# Patient Record
Sex: FEMALE | Race: WHITE
[De-identification: names, ages, dates, MRNs, and addresses within clinical notes are randomized per-mention and may not be internally consistent; named-entity substitution may affect disease eponyms.]

---

## 2019-07-26 ENCOUNTER — HOSPITAL ENCOUNTER (INPATIENT)
Dept: HOSPITAL 91 - OBT | Age: 29
LOS: 4 days | Discharge: HOME | DRG: 832 | End: 2019-07-30
Payer: COMMERCIAL

## 2019-07-26 ENCOUNTER — HOSPITAL ENCOUNTER (OUTPATIENT)
Dept: HOSPITAL 10 - OBT | Age: 29
Discharge: HOME | End: 2019-07-26
Attending: OBSTETRICS & GYNECOLOGY
Payer: COMMERCIAL

## 2019-07-26 ENCOUNTER — HOSPITAL ENCOUNTER (INPATIENT)
Dept: HOSPITAL 10 - OBT | Age: 29
LOS: 4 days | Discharge: HOME | DRG: 832 | End: 2019-07-30
Attending: OBSTETRICS & GYNECOLOGY | Admitting: OBSTETRICS & GYNECOLOGY
Payer: COMMERCIAL

## 2019-07-26 ENCOUNTER — HOSPITAL ENCOUNTER (OUTPATIENT)
Dept: HOSPITAL 91 - OBT | Age: 29
Discharge: HOME | End: 2019-07-26
Payer: COMMERCIAL

## 2019-07-26 VITALS
HEIGHT: 62 IN | HEIGHT: 62 IN | BODY MASS INDEX: 25.4 KG/M2 | WEIGHT: 138.01 LBS | BODY MASS INDEX: 25.4 KG/M2 | WEIGHT: 138.01 LBS

## 2019-07-26 VITALS
BODY MASS INDEX: 26.92 KG/M2 | WEIGHT: 137.13 LBS | WEIGHT: 137.13 LBS | BODY MASS INDEX: 26.92 KG/M2 | HEIGHT: 60 IN | HEIGHT: 60 IN

## 2019-07-26 VITALS — DIASTOLIC BLOOD PRESSURE: 81 MMHG | RESPIRATION RATE: 16 BRPM | HEART RATE: 108 BPM | SYSTOLIC BLOOD PRESSURE: 131 MMHG

## 2019-07-26 VITALS — HEART RATE: 86 BPM | DIASTOLIC BLOOD PRESSURE: 78 MMHG | SYSTOLIC BLOOD PRESSURE: 131 MMHG | RESPIRATION RATE: 16 BRPM

## 2019-07-26 DIAGNOSIS — Z3A.25: ICD-10-CM

## 2019-07-26 DIAGNOSIS — O26.872: ICD-10-CM

## 2019-07-26 DIAGNOSIS — N89.8: ICD-10-CM

## 2019-07-26 DIAGNOSIS — O23.02: ICD-10-CM

## 2019-07-26 DIAGNOSIS — O60.02: Primary | ICD-10-CM

## 2019-07-26 DIAGNOSIS — O26.892: Primary | ICD-10-CM

## 2019-07-26 LAB
ADD UMIC: NO
UR ASCORBIC ACID: NEGATIVE MG/DL
UR BILIRUBIN (DIP): NEGATIVE MG/DL
UR BLOOD (DIP): NEGATIVE MG/DL
UR CLARITY: CLEAR
UR COLOR: YELLOW
UR GLUCOSE (DIP): NEGATIVE MG/DL
UR KETONES (DIP): NEGATIVE MG/DL
UR LEUKOCYTE ESTERASE (DIP): NEGATIVE LEU/UL
UR NITRITE (DIP): NEGATIVE MG/DL
UR PH (DIP): 6 (ref 5–9)
UR SPECIFIC GRAVITY (DIP): 1.02 (ref 1–1.03)
UR TOTAL PROTEIN (DIP): NEGATIVE MG/DL
UR UROBILINOGEN (DIP): NEGATIVE MG/DL

## 2019-07-26 PROCEDURE — 93970 EXTREMITY STUDY: CPT

## 2019-07-26 PROCEDURE — G0463 HOSPITAL OUTPT CLINIC VISIT: HCPCS

## 2019-07-26 PROCEDURE — 76815 OB US LIMITED FETUS(S): CPT

## 2019-07-26 PROCEDURE — 85730 THROMBOPLASTIN TIME PARTIAL: CPT

## 2019-07-26 PROCEDURE — 85610 PROTHROMBIN TIME: CPT

## 2019-07-26 PROCEDURE — 86901 BLOOD TYPING SEROLOGIC RH(D): CPT

## 2019-07-26 PROCEDURE — 87086 URINE CULTURE/COLONY COUNT: CPT

## 2019-07-26 PROCEDURE — 86900 BLOOD TYPING SEROLOGIC ABO: CPT

## 2019-07-26 PROCEDURE — 76817 TRANSVAGINAL US OBSTETRIC: CPT

## 2019-07-26 PROCEDURE — 84439 ASSAY OF FREE THYROXINE: CPT

## 2019-07-26 PROCEDURE — 81003 URINALYSIS AUTO W/O SCOPE: CPT

## 2019-07-26 PROCEDURE — 86850 RBC ANTIBODY SCREEN: CPT

## 2019-07-26 PROCEDURE — 96372 THER/PROPH/DIAG INJ SC/IM: CPT

## 2019-07-26 PROCEDURE — 80053 COMPREHEN METABOLIC PANEL: CPT

## 2019-07-26 PROCEDURE — 85025 COMPLETE CBC W/AUTO DIFF WBC: CPT

## 2019-07-26 PROCEDURE — 80307 DRUG TEST PRSMV CHEM ANLYZR: CPT

## 2019-07-26 PROCEDURE — 84443 ASSAY THYROID STIM HORMONE: CPT

## 2019-07-26 PROCEDURE — 83735 ASSAY OF MAGNESIUM: CPT

## 2019-07-26 RX ADMIN — BETAMETHASONE SODIUM PHOSPHATE AND BETAMETHASONE ACETATE 1 MG: 3; 3 INJECTION, SUSPENSION INTRA-ARTICULAR; INTRALESIONAL; INTRAMUSCULAR at 14:09

## 2019-07-26 NOTE — TRIAGE
===================================

OB Triage

===================================

Datetime Report Generated by CPN: 07/26/2019 14:41

   

   

===========================

Datetime: 07/26/2019 14:00

===========================

   

 Monitor Mode:  External

 Resting Tone [place]:  Relaxed

   

===================================

Fetal Heart Rate

===================================

   

 FHR Baseline Rate:  135

 Monitor Mode:  External US

 FHR Baseline Changes:  No Baseline Change

 Variability:  Moderate 6-25 bpm

 Accelerations:  15X15

 Decelerations:  None

 Category:  Category I

 Pain Presence:  None/Denies

   

===========================

Datetime: 07/26/2019 13:00

===========================

   

   

===================================

Labor Evaluation

===================================

   

 Frequency:  0

      

 Monitor Mode:  External

 Resting Tone [place]:  Relaxed

   

===================================

Fetal Heart Rate

===================================

   

 FHR Baseline Rate:  135

 Monitor Mode:  External US

 FHR Baseline Changes:  No Baseline Change

 Variability:  Moderate 6-25 bpm

 Accelerations:  15X15

 Decelerations:  None

 Pain Presence:  None/Denies

   

===========================

Datetime: 07/26/2019 12:55

===========================

   

 Monitor Mode:  External US

   

===========================

Datetime: 07/26/2019 12:40

===========================

   

 Comments:  FHR US not detecting due to Pt sitting up eating food. 

   

===========================

Datetime: 07/26/2019 12:00

===========================

   

   

===================================

Labor Evaluation

===================================

   

 Frequency:  0

      

 Monitor Mode:  External

 Resting Tone [place]:  Relaxed

 Contraction Comments:  none via toco

   

===================================

Fetal Heart Rate

===================================

   

 FHR Baseline Rate:  135

 Monitor Mode:  External US

 FHR Baseline Changes:  No Baseline Change

 Variability:  Moderate 6-25 bpm

 Accelerations:  15X15

 Decelerations:  None

 Pain Presence:  None/Denies

   

===========================

Datetime: 07/26/2019 11:00

===========================

   

 Monitor Mode:  External

 Resting Tone [place]:  Relaxed

   

===================================

Fetal Heart Rate

===================================

   

 FHR Baseline Rate:  140

 Monitor Mode:  External US

 FHR Baseline Changes:  No Baseline Change

 Variability:  Moderate 6-25 bpm

 Accelerations:  15X15

 Decelerations:  None

 Category:  Category I

 Pain Presence:  None/Denies

   

===========================

Datetime: 07/26/2019 10:45

===========================

   

 Stage of Pregnancy:  OB Triage

   

===================================

Maternal Assessment

===================================

   

 Level of Consciousness:  Keenly Alert, Responsive

 DTR's/Clonus:  DTRs 2+; No Clonus

 Headache:  Denies

 Blurred Vision:  No

 Respiratory Effort:  Unlabored; Regular Rhythm; Equal Expansion

 Breath Sounds, Left:  Clear and Equal

 Breath Sounds, Right:  Clear and Equal

 Nausea/Vomiting:  Denies

 RUQ Epigastric Pain:  Denies

 Lower Extremities Edema:  None

     Degree:  None

 Upper Extremities Edema:  None

     Degree:  None

 Facial Edema:  None

   

===================================

Fall Risk Assessment

===================================

   

 History of Falling:  (0) No

 Secondary Diagnosis:  (0) No

 Ambulatory Aid:  (0) Bedrest/Nurse Assist

 IV Therapy:  (0) No

 Gait:  (0) Normal/Bedrest/Immobile

 Mental Status:  (0) Oriented to Own Ability

 Fall Score:  0

 Fall Risk Score Definition:  No Risk: No action required

   

===================================

Pain Assessment

===================================

   

 Pain Scale:  0

 Pain Presence:  None/Denies

 Pain Type:  N/A

   

===========================

Datetime: 07/26/2019 10:44

===========================

   

 Time of Arrival:  07/26/2019 10:22

 EGA:  25.2

   

===========================

Datetime: 07/26/2019 10:42

===========================

   

 Time of Arrival:  07/26/2019 10:22

 Arrived By:  Ambulatory

 Arrived From:   Office

 Chief Complaint:  Bleeding Upon wiping after urinating

 Fetal Movement:  Present

 Contractions:  Occasional

 Rupture of Membranes:  Denies

 Vaginal Bleeding:  Normal Show

 Vaginal Discharge:  Present

 Recent Sexual Intercouse:  Denies

 Abdominal Trauma:  Not Applicable

 Patient Complaints:  Other

 Time Provider Notified:  07/26/2019 10:43

 Provider Notified:  Eshaghian

 Initial Plan:  NST, Cervical Length

   

===========================

Datetime: 07/26/2019 10:35

===========================

   

 Temperature Route:  Oral

   

===========================

Datetime: 07/26/2019 10:34

===========================

   

 Stage of Pregnancy:  OB Triage

## 2019-07-26 NOTE — PN
Triage Information


Date/Time


19


Reason for visit:  vaginal discharge


Weeks of Gestation


25w2d


/Para


A1


Hypertention:  none


Additional information


plan to have amniocentesis  for  thich nuchal fold





Objective





Vital Signs


  Date      Temp  Pulse  Resp  B/P (MAP)   Pulse Ox  O2          O2 Flow    FiO2


Time                                                 Delivery    Rate


   19  98.4     86    16      131/78            Room Air


     10:40                           (95)





Contractions:  < 5 Minutes Apart


Exam


uc resolved





Results/Medications


Results 24 hrs





Laboratory Tests


                    Test
                     19
10:59


                    Urine Color               YELLOW


                    Urine Clarity             CLEAR


                    Urine pH                          6.0


                    Urine Specific Gravity          1.021


                    Urine Ketones             NEGATIVE


                    Urine Nitrite             NEGATIVE


                    Urine Bilirubin           NEGATIVE


                    Urine Urobilinogen        NEGATIVE


                    Urine Leukocyte Esterase  NEGATIVE


                    Urine Hemoglobin          NEGATIVE


                    Urine Glucose             NEGATIVE


                    Urine Total Protein       NEGATIVE





Medications


BMZx1 dose


Imaging Results


CVL  2.7


Disposition:  Discharge


Assessment/Plan


A    IUP  25w2d 


       short cervix  but not to be admitted 


 P    BMZx1  given


        RTH tomorrow same time  for 2nd  dose of BMZ


        modified  bed rest











GRACIELA LAWSON MD                2019 16:44

## 2019-07-27 LAB
ADD MAN DIFF?: NO
ALANINE AMINOTRANSFERASE: 16 IU/L (ref 13–69)
ALBUMIN/GLOBULIN RATIO: 1.1
ALBUMIN: 4.3 G/DL (ref 3.3–4.9)
ALKALINE PHOSPHATASE: 65 IU/L (ref 42–121)
ANION GAP: 11 (ref 5–13)
ASPARTATE AMINO TRANSFERASE: 22 IU/L (ref 15–46)
BASOPHIL #: 0 10^3/UL (ref 0–0.1)
BASOPHILS %: 0.3 % (ref 0–2)
BILIRUBIN,DIRECT: 0 MG/DL (ref 0–0.2)
BILIRUBIN,TOTAL: 0.6 MG/DL (ref 0.2–1.3)
BLOOD UREA NITROGEN: 4 MG/DL (ref 7–20)
CALCIUM: 9.9 MG/DL (ref 8.4–10.2)
CARBON DIOXIDE: 21 MMOL/L (ref 21–31)
CHLORIDE: 106 MMOL/L (ref 97–110)
CREATININE: 0.43 MG/DL (ref 0.44–1)
EOSINOPHILS #: 0 10^3/UL (ref 0–0.5)
EOSINOPHILS %: 0 % (ref 0–7)
FREE T4 (FREE THYROXINE): 0.57 NG/DL (ref 0.79–2.35)
GLOBULIN: 3.9 G/DL (ref 1.3–3.2)
GLUCOSE: 112 MG/DL (ref 70–220)
HEMATOCRIT: 33.7 % (ref 37–47)
HEMOGLOBIN: 11.5 G/DL (ref 12–16)
IMMATURE GRANS #M: 0.18 10^3/UL (ref 0–0.03)
IMMATURE GRANS % (M): 1.5 % (ref 0–0.43)
INR: 1.04
LYMPHOCYTES #: 1.2 10^3/UL (ref 0.8–2.9)
LYMPHOCYTES %: 9.7 % (ref 15–51)
MAGNESIUM: 4.5 MG/DL (ref 1.7–2.5)
MAGNESIUM: 5.4 MG/DL (ref 1.7–2.5)
MAGNESIUM: 5.8 MG/DL (ref 1.7–2.5)
MEAN CORPUSCULAR HEMOGLOBIN: 30.6 PG (ref 29–33)
MEAN CORPUSCULAR HGB CONC: 34.1 G/DL (ref 32–37)
MEAN CORPUSCULAR VOLUME: 89.6 FL (ref 82–101)
MEAN PLATELET VOLUME: 9.6 FL (ref 7.4–10.4)
MONOCYTE #: 0.2 10^3/UL (ref 0.3–0.9)
MONOCYTES %: 1.6 % (ref 0–11)
NEUTROPHIL #: 10.7 10^3/UL (ref 1.6–7.5)
NEUTROPHILS %: 86.9 % (ref 39–77)
NUCLEATED RED BLOOD CELLS #: 0 10^3/UL (ref 0–0)
NUCLEATED RED BLOOD CELLS%: 0 /100WBC (ref 0–0)
PARTIAL THROMBOPLASTIN TIME: 31.5 SEC (ref 23–35)
PLATELET COUNT: 234 10^3/UL (ref 140–415)
POTASSIUM: 3.9 MMOL/L (ref 3.5–5.1)
PROTIME: 13.7 SEC (ref 11.9–14.9)
PT RATIO: 1.1
RED BLOOD COUNT: 3.76 10^6/UL (ref 4.2–5.4)
RED CELL DISTRIBUTION WIDTH: 12.9 % (ref 11.5–14.5)
SODIUM: 138 MMOL/L (ref 135–144)
THYROID STIMULATING HORMONE: 0.85 MIU/L (ref 0.47–4.68)
TOTAL PROTEIN: 8.2 G/DL (ref 6.1–8.1)
WHITE BLOOD COUNT: 12.4 10^3/UL (ref 4.8–10.8)

## 2019-07-27 RX ADMIN — ACETAMINOPHEN 1 MG: 325 TABLET, FILM COATED ORAL at 03:01

## 2019-07-27 RX ADMIN — MAGNESIUM SULFATE HEPTAHYDRATE 1 MLS/HR: 40 INJECTION, SOLUTION INTRAVENOUS at 02:21

## 2019-07-27 RX ADMIN — PYRIDOXINE HYDROCHLORIDE 1 MLS/HR: 100 INJECTION, SOLUTION INTRAMUSCULAR; INTRAVENOUS at 16:11

## 2019-07-27 RX ADMIN — MAGNESIUM SULFATE IN WATER 1 MLS/HR: 40 INJECTION, SOLUTION INTRAVENOUS at 13:29

## 2019-07-27 RX ADMIN — Medication 1 TAB: at 08:55

## 2019-07-27 RX ADMIN — THIAMINE HYDROCHLORIDE 1 MLS/HR: 100 INJECTION, SOLUTION INTRAMUSCULAR; INTRAVENOUS at 02:35

## 2019-07-27 RX ADMIN — CEFTRIAXONE SCH MLS/HR: 1 INJECTION, SOLUTION INTRAVENOUS at 02:34

## 2019-07-27 RX ADMIN — PYRIDOXINE HYDROCHLORIDE 1 MLS/HR: 100 INJECTION, SOLUTION INTRAMUSCULAR; INTRAVENOUS at 01:58

## 2019-07-27 RX ADMIN — MAGNESIUM SULFATE IN WATER SCH MLS/HR: 40 INJECTION, SOLUTION INTRAVENOUS at 13:29

## 2019-07-27 RX ADMIN — MAGNESIUM SULFATE IN WATER SCH MLS/HR: 40 INJECTION, SOLUTION INTRAVENOUS at 02:58

## 2019-07-27 RX ADMIN — CEFTRIAXONE 1 MLS/HR: 1 INJECTION, SOLUTION INTRAVENOUS at 02:34

## 2019-07-27 RX ADMIN — MAGNESIUM SULFATE IN WATER 1 MLS/HR: 40 INJECTION, SOLUTION INTRAVENOUS at 02:58

## 2019-07-27 RX ADMIN — DOCUSATE SODIUM SCH MG: 100 CAPSULE, LIQUID FILLED ORAL at 08:54

## 2019-07-27 RX ADMIN — FOLIC ACID SCH MLS/HR: 5 INJECTION, SOLUTION INTRAMUSCULAR; INTRAVENOUS; SUBCUTANEOUS at 02:35

## 2019-07-27 RX ADMIN — DOCUSATE SODIUM 1 MG: 100 CAPSULE, LIQUID FILLED ORAL at 08:54

## 2019-07-27 RX ADMIN — Medication SCH TAB: at 08:55

## 2019-07-27 RX ADMIN — BETAMETHASONE SODIUM PHOSPHATE AND BETAMETHASONE ACETATE 1 MG: 3; 3 INJECTION, SUSPENSION INTRA-ARTICULAR; INTRALESIONAL; INTRAMUSCULAR at 14:02

## 2019-07-27 NOTE — PREOPHP
DATE OF ADMISSION: 2019

 

HISTORY OF PRESENT ILLNESS:  Ms. Tabatha Samuel is a 29-year-old  4, para 2, EDC 2019 int
rauterine pregnancy at 25 weeks and 2 days gestational age, presented to triage complaining of lower 
abdominal pain radiating to her back.  She denies any headaches, nausea, vomiting, shortness of breat
h, or visual change.  She was also seen yesterday for similar evaluation where she was given steroid 
treatment secondary to a short cervix.  Her prenatal care took place at [facility]
.

 

PAST MEDICAL HISTORY:  Hyperthyroidism.

 

MEDICATIONS:  Prenatal vitamins.

 

PAST SURGICAL HISTORY:  Times 2 vaginal deliveries, x1 missed AB.

 

GYNECOLOGIC HISTORY:  Tw[age], regular 3 to 4 days.  Denies any sexually transmitted infections.  Sexu
ally active with 1 partner.

 

SOCIAL HISTORY:  Denies any smoking, drugs or alcohol.

 

FAMILY HISTORY:  None.

 

REVIEW OF SYSTEMS:  All within normal except history of present illness.

 

PHYSICAL EXAMINATION:

HEENT:  Within normal.

LUNGS:  CTA bilateral.

CARDIOVASCULAR:  S1, S2, regular rhythm.

ABDOMEN:  Gravid, positive lower abdominal tenderness.  Positive bilateral CVA tenderness.

EXTREMITIES:  Negative edema.  No calf tenderness.

PELVIC:  Vaginal exam short and closed.  Fetal heart tracing category 1.  [place] regular contractions.

 

ASSESSMENT:  Intrauterine pregnancy at 25 weeks and 2 days gestational age with  contraction, 
suspected pyelonephritis.

 

PLAN:  Admit patient for IV hydration, Rocephin 1 gram q.24 hours, magnesium sulfate for tocolysis, n
eonatology consult, perinatology consult, repeat cervical length.

 

 

Dictated By: KENDAL GAUTHIER/ALISTAIR

DD:    2019 01:55:09

DT:    2019 02:10:49

Conf#: [id number]

DID#:  [id number]

## 2019-07-28 LAB
MAGNESIUM: 5.1 MG/DL (ref 1.7–2.5)
MAGNESIUM: 6 MG/DL (ref 1.7–2.5)
MAGNESIUM: 6.3 MG/DL (ref 1.7–2.5)

## 2019-07-28 RX ADMIN — MAGNESIUM SULFATE IN WATER SCH MLS/HR: 40 INJECTION, SOLUTION INTRAVENOUS at 00:41

## 2019-07-28 RX ADMIN — Medication SCH TAB: at 09:14

## 2019-07-28 RX ADMIN — CEFTRIAXONE SCH MLS/HR: 1 INJECTION, SOLUTION INTRAVENOUS at 02:42

## 2019-07-28 RX ADMIN — CEFTRIAXONE 1 MLS/HR: 1 INJECTION, SOLUTION INTRAVENOUS at 02:42

## 2019-07-28 RX ADMIN — DOCUSATE SODIUM 1 MG: 100 CAPSULE, LIQUID FILLED ORAL at 09:14

## 2019-07-28 RX ADMIN — DOCUSATE SODIUM SCH MG: 100 CAPSULE, LIQUID FILLED ORAL at 09:14

## 2019-07-28 RX ADMIN — THIAMINE HYDROCHLORIDE 1 MLS/HR: 100 INJECTION, SOLUTION INTRAMUSCULAR; INTRAVENOUS at 02:01

## 2019-07-28 RX ADMIN — MAGNESIUM SULFATE IN WATER 1 MLS/HR: 40 INJECTION, SOLUTION INTRAVENOUS at 00:41

## 2019-07-28 RX ADMIN — FOLIC ACID SCH MLS/HR: 5 INJECTION, SOLUTION INTRAMUSCULAR; INTRAVENOUS; SUBCUTANEOUS at 22:31

## 2019-07-28 RX ADMIN — THIAMINE HYDROCHLORIDE 1 MLS/HR: 100 INJECTION, SOLUTION INTRAMUSCULAR; INTRAVENOUS at 22:31

## 2019-07-28 RX ADMIN — ONDANSETRON HYDROCHLORIDE 1 MG: 2 INJECTION, SOLUTION INTRAMUSCULAR; INTRAVENOUS at 00:42

## 2019-07-28 RX ADMIN — FOLIC ACID SCH MLS/HR: 5 INJECTION, SOLUTION INTRAMUSCULAR; INTRAVENOUS; SUBCUTANEOUS at 15:59

## 2019-07-28 RX ADMIN — Medication 1 TAB: at 09:14

## 2019-07-28 RX ADMIN — FOLIC ACID SCH MLS/HR: 5 INJECTION, SOLUTION INTRAMUSCULAR; INTRAVENOUS; SUBCUTANEOUS at 02:01

## 2019-07-28 RX ADMIN — THIAMINE HYDROCHLORIDE 1 MLS/HR: 100 INJECTION, SOLUTION INTRAMUSCULAR; INTRAVENOUS at 15:59

## 2019-07-28 NOTE — QN
Documentation


Comment


progress note


patient seen and evaluated


no complaints


vs stable afebrile


ab gravid soft right cva tenderness


extremity no edema no calf tenderness





fhr 140's


toco no ctx





a/  Intrauterine pregnancy at 25 weeks and 2 days gestational age with  


contraction resolving, suspected pyelonephritis.


 


PLAN:  d/c mg 24 hrs after second dose of steroids


continue iv antibiotic


perinatology and nicu consult.


f/u urine cultures











KENDAL KIMBROUGH MD           2019 10:02

## 2019-07-29 RX ADMIN — FOLIC ACID SCH MLS/HR: 5 INJECTION, SOLUTION INTRAMUSCULAR; INTRAVENOUS; SUBCUTANEOUS at 07:45

## 2019-07-29 RX ADMIN — DOCUSATE SODIUM 1 MG: 100 CAPSULE, LIQUID FILLED ORAL at 08:50

## 2019-07-29 RX ADMIN — DOCUSATE SODIUM SCH MG: 100 CAPSULE, LIQUID FILLED ORAL at 20:46

## 2019-07-29 RX ADMIN — CEFTRIAXONE SCH MLS/HR: 1 INJECTION, SOLUTION INTRAVENOUS at 02:28

## 2019-07-29 RX ADMIN — THIAMINE HYDROCHLORIDE 1 MLS/HR: 100 INJECTION, SOLUTION INTRAMUSCULAR; INTRAVENOUS at 20:28

## 2019-07-29 RX ADMIN — Medication SCH TAB: at 08:50

## 2019-07-29 RX ADMIN — FOLIC ACID SCH MLS/HR: 5 INJECTION, SOLUTION INTRAMUSCULAR; INTRAVENOUS; SUBCUTANEOUS at 20:28

## 2019-07-29 RX ADMIN — Medication 1 TAB: at 08:50

## 2019-07-29 RX ADMIN — CEFTRIAXONE 1 MLS/HR: 1 INJECTION, SOLUTION INTRAVENOUS at 02:28

## 2019-07-29 RX ADMIN — DOCUSATE SODIUM SCH MG: 100 CAPSULE, LIQUID FILLED ORAL at 08:50

## 2019-07-29 RX ADMIN — DOCUSATE SODIUM 1 MG: 100 CAPSULE, LIQUID FILLED ORAL at 20:46

## 2019-07-29 RX ADMIN — THIAMINE HYDROCHLORIDE 1 MLS/HR: 100 INJECTION, SOLUTION INTRAMUSCULAR; INTRAVENOUS at 07:45

## 2019-07-29 NOTE — QN
Documentation


Comment


progress note


patient seen and evaluated


no complaint


vs stable afebrile


ab gravid mild left cva tenderness


extremity no edema no calf tenderness





a/   Intrauterine pregnancy at 25 weeks and 3 days gestational age with  


contraction, pyelonephritis improving symptoms





p/ continue iv antibiotics











KENDAL KIMBROUGH MD           2019 11:57

## 2019-07-30 RX ADMIN — DOCUSATE SODIUM 1 MG: 100 CAPSULE, LIQUID FILLED ORAL at 08:46

## 2019-07-30 RX ADMIN — THIAMINE HYDROCHLORIDE 1 MLS/HR: 100 INJECTION, SOLUTION INTRAMUSCULAR; INTRAVENOUS at 08:47

## 2019-07-30 RX ADMIN — CEFTRIAXONE 1 MLS/HR: 1 INJECTION, SOLUTION INTRAVENOUS at 02:11

## 2019-07-30 RX ADMIN — Medication SCH TAB: at 08:46

## 2019-07-30 RX ADMIN — DOCUSATE SODIUM SCH MG: 100 CAPSULE, LIQUID FILLED ORAL at 08:46

## 2019-07-30 RX ADMIN — Medication 1 TAB: at 08:46

## 2019-07-30 RX ADMIN — CEFTRIAXONE SCH MLS/HR: 1 INJECTION, SOLUTION INTRAVENOUS at 02:11

## 2019-07-30 RX ADMIN — FOLIC ACID SCH MLS/HR: 5 INJECTION, SOLUTION INTRAMUSCULAR; INTRAVENOUS; SUBCUTANEOUS at 08:47

## 2019-07-31 NOTE — DS
DATE OF ADMISSION: 2019

DATE OF DISCHARGE: 2019

 

PRIMARY DIAGNOSES:  Intrauterine pregnancy at 25 weeks and 2 days gestational age with  contra
ctions, suspected pyelonephritis, undelivered.

 

PROCEDURE:  None.

 

CONDITION ON DISCHARGE:  Stable.

 

ACTIVITY:  As tolerated.

 

DIET:  Regular.

 

MEDICATIONS ON DISCHARGE:  Continue prenatal vitamins.

 

DISCHARGE SUMMARY:  Ms. Tabatha Samuel is a 29-year-old  4, para 2, admitted on 2019 for 
 contractions with the suspected pyelonephritis.  She was given IV hydration with Rocephin 1 g
iqra q.24 hours and given magnesium sulfate for tocolysis.  Her urine culture was returned to be negat
gato.  She currently denies any headache, nausea, vomiting, shortness of breath, visual changes or epi
gastric pain.  She denies any contractions.  Patient feels a marked improvement since admission.  She
 will be discharged home today and follow up with her regular prenatal care.  Strict  and feta
l kick counts were given to the patient.

 

 

Dictated By: KENDAL GAUTHIER/ALISTAIR

DD:    2019 17:44:21

DT:    2019 02:30:24

Conf#: [id number]

DID#:  [id number]

## 2019-08-27 ENCOUNTER — HOSPITAL ENCOUNTER (INPATIENT)
Dept: HOSPITAL 91 - OBT | Age: 29
LOS: 3 days | Discharge: HOME | DRG: 833 | End: 2019-08-30
Payer: COMMERCIAL

## 2019-08-27 ENCOUNTER — HOSPITAL ENCOUNTER (INPATIENT)
Dept: HOSPITAL 10 - OBT | Age: 29
LOS: 3 days | Discharge: HOME | DRG: 833 | End: 2019-08-30
Attending: OBSTETRICS & GYNECOLOGY | Admitting: OBSTETRICS & GYNECOLOGY
Payer: COMMERCIAL

## 2019-08-27 VITALS
WEIGHT: 138.89 LBS | HEIGHT: 62 IN | BODY MASS INDEX: 25.56 KG/M2 | WEIGHT: 138.89 LBS | HEIGHT: 62 IN | BODY MASS INDEX: 25.56 KG/M2

## 2019-08-27 DIAGNOSIS — Z3A.29: ICD-10-CM

## 2019-08-27 DIAGNOSIS — O26.853: Primary | ICD-10-CM

## 2019-08-27 LAB
ABNORMAL IP MESSAGE: 1
ADD MAN DIFF?: YES
ADD UMIC: YES
ANISOCYTOSIS: (no result) (ref 0–0)
BAND NEUTROPHILS #M: 1.1 10^3/UL (ref 0–0.6)
BAND NEUTROPHILS % (M): 9 % (ref 0–4)
BASOPHIL #M: 0.2 10^3/UL (ref 0–0)
BASOPHILS % (M): 2 % (ref 0–2)
GIANT THROMBO% (M): 1 % (ref 0–0)
HEMATOCRIT: 31.2 % (ref 37–47)
HEMOGLOBIN: 10.3 G/DL (ref 12–16)
IMMATURE GRANS #M: 0.72 10^3/UL (ref 0–0.03)
IMMATURE GRANS % (M): 5.8 % (ref 0–0.43)
LYMPHOCYTES #M: 3.1 10^3/UL (ref 0.8–2.9)
LYMPHOCYTES % (M): 25 % (ref 15–51)
MAGNESIUM: 4.7 MG/DL (ref 1.7–2.5)
MEAN CORPUSCULAR HEMOGLOBIN: 30.1 PG (ref 29–33)
MEAN CORPUSCULAR HGB CONC: 33 G/DL (ref 32–37)
MEAN CORPUSCULAR VOLUME: 91.2 FL (ref 82–101)
MEAN PLATELET VOLUME: 9.9 FL (ref 7.4–10.4)
MICROCYTOSIS: (no result) (ref 0–0)
MONOCYTE #M: 0.2 10^3/UL (ref 0.3–0.9)
MONOCYTES % (M): 2 % (ref 0–11)
MYELOCYTES #M: 0.2 10^3/UL (ref 0–0)
MYELOCYTES % (M): 2 % (ref 0–0)
NUCLEATED RED BLOOD CELLS%: 0 /100WBC (ref 0–0)
PLATELET COUNT: 187 10^3/UL (ref 140–415)
PLATELET ESTIMATE: NORMAL
POSITIVE DIFF: (no result)
RED BLOOD COUNT: 3.42 10^6/UL (ref 4.2–5.4)
RED CELL DISTRIBUTION WIDTH: 13.2 % (ref 11.5–14.5)
SEG NEUT #M: 7.6 10^3/UL (ref 1.6–7.5)
SEGMENTED NEUTROPHILS (M) %: 60 % (ref 39–77)
UR ASCORBIC ACID: NEGATIVE MG/DL
UR BILIRUBIN (DIP): NEGATIVE MG/DL
UR BLOOD (DIP): (no result) MG/DL
UR CLARITY: (no result)
UR COLOR: YELLOW
UR GLUCOSE (DIP): NEGATIVE MG/DL
UR KETONES (DIP): NEGATIVE MG/DL
UR LEUKOCYTE ESTERASE (DIP): (no result) LEU/UL
UR NITRITE (DIP): NEGATIVE MG/DL
UR PH (DIP): 7 (ref 5–9)
UR RBC: 2 /HPF (ref 0–5)
UR SPECIFIC GRAVITY (DIP): 1.02 (ref 1–1.03)
UR SQUAMOUS EPITHELIAL CELL: (no result) /HPF
UR TOTAL PROTEIN (DIP): NEGATIVE MG/DL
UR UROBILINOGEN (DIP): NEGATIVE MG/DL
UR WBC: 4 /HPF (ref 0–5)
WHITE BLOOD COUNT: 12.4 10^3/UL (ref 4.8–10.8)

## 2019-08-27 PROCEDURE — 86900 BLOOD TYPING SEROLOGIC ABO: CPT

## 2019-08-27 PROCEDURE — 76818 FETAL BIOPHYS PROFILE W/NST: CPT

## 2019-08-27 PROCEDURE — 86901 BLOOD TYPING SEROLOGIC RH(D): CPT

## 2019-08-27 PROCEDURE — 85025 COMPLETE CBC W/AUTO DIFF WBC: CPT

## 2019-08-27 PROCEDURE — 85014 HEMATOCRIT: CPT

## 2019-08-27 PROCEDURE — 81001 URINALYSIS AUTO W/SCOPE: CPT

## 2019-08-27 PROCEDURE — 85018 HEMOGLOBIN: CPT

## 2019-08-27 PROCEDURE — 76817 TRANSVAGINAL US OBSTETRIC: CPT

## 2019-08-27 PROCEDURE — 83735 ASSAY OF MAGNESIUM: CPT

## 2019-08-27 PROCEDURE — G0463 HOSPITAL OUTPT CLINIC VISIT: HCPCS

## 2019-08-27 RX ADMIN — MAGNESIUM SULFATE HEPTAHYDRATE 1 MLS/HR: 40 INJECTION, SOLUTION INTRAVENOUS at 12:59

## 2019-08-27 RX ADMIN — MAGNESIUM SULFATE IN WATER SCH MLS/HR: 40 INJECTION, SOLUTION INTRAVENOUS at 23:35

## 2019-08-27 RX ADMIN — PYRIDOXINE HYDROCHLORIDE SCH MLS/HR: 100 INJECTION, SOLUTION INTRAMUSCULAR; INTRAVENOUS at 19:30

## 2019-08-27 RX ADMIN — MAGNESIUM SULFATE IN WATER SCH MLS/HR: 40 INJECTION, SOLUTION INTRAVENOUS at 13:27

## 2019-08-27 RX ADMIN — PYRIDOXINE HYDROCHLORIDE 1 MLS/HR: 100 INJECTION, SOLUTION INTRAMUSCULAR; INTRAVENOUS at 13:00

## 2019-08-27 RX ADMIN — MAGNESIUM SULFATE IN WATER 1 MLS/HR: 40 INJECTION, SOLUTION INTRAVENOUS at 23:35

## 2019-08-27 RX ADMIN — PYRIDOXINE HYDROCHLORIDE SCH MLS/HR: 100 INJECTION, SOLUTION INTRAMUSCULAR; INTRAVENOUS at 13:00

## 2019-08-27 RX ADMIN — ACETAMINOPHEN 1 MG: 325 TABLET, FILM COATED ORAL at 21:08

## 2019-08-27 RX ADMIN — PYRIDOXINE HYDROCHLORIDE 1 MLS/HR: 100 INJECTION, SOLUTION INTRAMUSCULAR; INTRAVENOUS at 19:30

## 2019-08-27 RX ADMIN — BETAMETHASONE SODIUM PHOSPHATE AND BETAMETHASONE ACETATE 1 MG: 3; 3 INJECTION, SUSPENSION INTRA-ARTICULAR; INTRALESIONAL; INTRAMUSCULAR at 12:31

## 2019-08-27 RX ADMIN — MAGNESIUM SULFATE IN WATER 1 MLS/HR: 40 INJECTION, SOLUTION INTRAVENOUS at 13:27

## 2019-08-27 RX ADMIN — BETAMETHASONE SODIUM PHOSPHATE AND BETAMETHASONE ACETATE SCH MG: 3; 3 INJECTION, SUSPENSION INTRA-ARTICULAR; INTRALESIONAL; INTRAMUSCULAR at 12:31

## 2019-08-27 RX ADMIN — CEFAZOLIN 1 MLS/HR: 1 INJECTION, POWDER, FOR SOLUTION INTRAMUSCULAR; INTRAVENOUS at 13:14

## 2019-08-28 LAB
MAGNESIUM: 5.4 MG/DL (ref 1.7–2.5)
MAGNESIUM: 5.5 MG/DL (ref 1.7–2.5)
MAGNESIUM: 5.8 MG/DL (ref 1.7–2.5)
MAGNESIUM: 5.8 MG/DL (ref 1.7–2.5)

## 2019-08-28 RX ADMIN — ONDANSETRON HYDROCHLORIDE 1 MG: 2 INJECTION, SOLUTION INTRAMUSCULAR; INTRAVENOUS at 11:22

## 2019-08-28 RX ADMIN — PYRIDOXINE HYDROCHLORIDE SCH MLS/HR: 100 INJECTION, SOLUTION INTRAMUSCULAR; INTRAVENOUS at 05:02

## 2019-08-28 RX ADMIN — PYRIDOXINE HYDROCHLORIDE SCH MLS/HR: 100 INJECTION, SOLUTION INTRAMUSCULAR; INTRAVENOUS at 18:15

## 2019-08-28 RX ADMIN — PYRIDOXINE HYDROCHLORIDE 1 MLS/HR: 100 INJECTION, SOLUTION INTRAMUSCULAR; INTRAVENOUS at 18:15

## 2019-08-28 RX ADMIN — FERROUS SULFATE TAB 325 MG (65 MG ELEMENTAL FE) 1 MG: 325 (65 FE) TAB at 09:41

## 2019-08-28 RX ADMIN — BETAMETHASONE SODIUM PHOSPHATE AND BETAMETHASONE ACETATE 1 MG: 3; 3 INJECTION, SUSPENSION INTRA-ARTICULAR; INTRALESIONAL; INTRAMUSCULAR at 12:50

## 2019-08-28 RX ADMIN — MAGNESIUM SULFATE IN WATER 1 MLS/HR: 40 INJECTION, SOLUTION INTRAVENOUS at 21:39

## 2019-08-28 RX ADMIN — MAGNESIUM SULFATE IN WATER SCH MLS/HR: 40 INJECTION, SOLUTION INTRAVENOUS at 10:56

## 2019-08-28 RX ADMIN — BETAMETHASONE SODIUM PHOSPHATE AND BETAMETHASONE ACETATE SCH MG: 3; 3 INJECTION, SUSPENSION INTRA-ARTICULAR; INTRALESIONAL; INTRAMUSCULAR at 12:50

## 2019-08-28 RX ADMIN — PYRIDOXINE HYDROCHLORIDE 1 MLS/HR: 100 INJECTION, SOLUTION INTRAMUSCULAR; INTRAVENOUS at 05:02

## 2019-08-28 RX ADMIN — MAGNESIUM SULFATE IN WATER 1 MLS/HR: 40 INJECTION, SOLUTION INTRAVENOUS at 10:56

## 2019-08-28 RX ADMIN — MAGNESIUM SULFATE IN WATER SCH MLS/HR: 40 INJECTION, SOLUTION INTRAVENOUS at 21:39

## 2019-08-28 RX ADMIN — Medication 1 TAB: at 09:41

## 2019-08-28 RX ADMIN — FERROUS SULFATE TAB 325 MG (65 MG ELEMENTAL FE) SCH MG: 325 (65 FE) TAB at 09:41

## 2019-08-28 RX ADMIN — Medication SCH TAB: at 09:41

## 2019-08-28 RX ADMIN — DOCUSATE SODIUM 1 MG: 100 CAPSULE, LIQUID FILLED ORAL at 09:41

## 2019-08-28 RX ADMIN — DOCUSATE SODIUM SCH MG: 100 CAPSULE, LIQUID FILLED ORAL at 09:41

## 2019-08-29 LAB
HEMATOCRIT: 29.5 % (ref 37–47)
HEMOGLOBIN: 9.9 G/DL (ref 12–16)
MAGNESIUM: 6.1 MG/DL (ref 1.7–2.5)

## 2019-08-29 RX ADMIN — FERROUS SULFATE TAB 325 MG (65 MG ELEMENTAL FE) SCH MG: 325 (65 FE) TAB at 08:49

## 2019-08-29 RX ADMIN — PYRIDOXINE HYDROCHLORIDE SCH MLS/HR: 100 INJECTION, SOLUTION INTRAMUSCULAR; INTRAVENOUS at 08:01

## 2019-08-29 RX ADMIN — DOCUSATE SODIUM 1 MG: 100 CAPSULE, LIQUID FILLED ORAL at 08:49

## 2019-08-29 RX ADMIN — MAGNESIUM SULFATE IN WATER 1 MLS/HR: 40 INJECTION, SOLUTION INTRAVENOUS at 08:04

## 2019-08-29 RX ADMIN — Medication 1 TAB: at 08:50

## 2019-08-29 RX ADMIN — DOCUSATE SODIUM SCH MG: 100 CAPSULE, LIQUID FILLED ORAL at 08:49

## 2019-08-29 RX ADMIN — ACETAMINOPHEN 1 MG: 325 TABLET, FILM COATED ORAL at 23:37

## 2019-08-29 RX ADMIN — PYRIDOXINE HYDROCHLORIDE 1 MLS/HR: 100 INJECTION, SOLUTION INTRAMUSCULAR; INTRAVENOUS at 08:01

## 2019-08-29 RX ADMIN — Medication SCH TAB: at 08:50

## 2019-08-29 RX ADMIN — MAGNESIUM SULFATE IN WATER SCH MLS/HR: 40 INJECTION, SOLUTION INTRAVENOUS at 08:04

## 2019-08-29 RX ADMIN — FERROUS SULFATE TAB 325 MG (65 MG ELEMENTAL FE) 1 MG: 325 (65 FE) TAB at 08:49
